# Patient Record
Sex: FEMALE | Race: WHITE | Employment: UNEMPLOYED | URBAN - METROPOLITAN AREA
[De-identification: names, ages, dates, MRNs, and addresses within clinical notes are randomized per-mention and may not be internally consistent; named-entity substitution may affect disease eponyms.]

---

## 2018-07-25 ENCOUNTER — OFFICE VISIT (OUTPATIENT)
Dept: ENT CLINIC | Age: 83
End: 2018-07-25
Payer: MEDICARE

## 2018-07-25 VITALS
WEIGHT: 110 LBS | HEART RATE: 89 BPM | OXYGEN SATURATION: 99 % | SYSTOLIC BLOOD PRESSURE: 142 MMHG | BODY MASS INDEX: 21.6 KG/M2 | HEIGHT: 60 IN | DIASTOLIC BLOOD PRESSURE: 72 MMHG

## 2018-07-25 DIAGNOSIS — R49.0 HOARSENESS: Primary | ICD-10-CM

## 2018-07-25 PROCEDURE — 1036F TOBACCO NON-USER: CPT | Performed by: OTOLARYNGOLOGY

## 2018-07-25 PROCEDURE — 31575 DIAGNOSTIC LARYNGOSCOPY: CPT | Performed by: OTOLARYNGOLOGY

## 2018-07-25 PROCEDURE — G8427 DOCREV CUR MEDS BY ELIG CLIN: HCPCS | Performed by: OTOLARYNGOLOGY

## 2018-07-25 PROCEDURE — 1101F PT FALLS ASSESS-DOCD LE1/YR: CPT | Performed by: OTOLARYNGOLOGY

## 2018-07-25 PROCEDURE — G8420 CALC BMI NORM PARAMETERS: HCPCS | Performed by: OTOLARYNGOLOGY

## 2018-07-25 PROCEDURE — 99204 OFFICE O/P NEW MOD 45 MIN: CPT | Performed by: OTOLARYNGOLOGY

## 2018-07-25 PROCEDURE — 1090F PRES/ABSN URINE INCON ASSESS: CPT | Performed by: OTOLARYNGOLOGY

## 2018-07-25 PROCEDURE — 1123F ACP DISCUSS/DSCN MKR DOCD: CPT | Performed by: OTOLARYNGOLOGY

## 2018-07-25 PROCEDURE — 4040F PNEUMOC VAC/ADMIN/RCVD: CPT | Performed by: OTOLARYNGOLOGY

## 2018-07-25 RX ORDER — FLUTICASONE PROPIONATE 50 MCG
2 SPRAY, SUSPENSION (ML) NASAL DAILY
Qty: 1 BOTTLE | Refills: 3 | Status: SHIPPED | OUTPATIENT
Start: 2018-07-25

## 2018-07-25 RX ORDER — BIOTIN 1 MG
TABLET ORAL DAILY
COMMUNITY

## 2018-07-25 ASSESSMENT — ENCOUNTER SYMPTOMS
COLOR CHANGE: 0
ABDOMINAL PAIN: 0
EYES NEGATIVE: 1
SHORTNESS OF BREATH: 0
SINUS PRESSURE: 1
RESPIRATORY NEGATIVE: 1
GASTROINTESTINAL NEGATIVE: 1
RHINORRHEA: 1

## 2018-07-25 NOTE — PROGRESS NOTES
Subjective:      Patient ID:  Maggy Payan is a 80 y.o. female. HPI:    Hoarseness  Patient presents with hoarseness. The patient describes moderate episodes of hoarseness which are gradually worsening over time. The episodes began 6 month(s) ago. Symptoms of gastroesophageal reflux is not noted. Tx: none Pt has hiatal hernia but no meds. Symptoms of allergic rhinitis is noted. Tx: nothing additional      Trauma/Surgeries in the chest or neck? no  Type:     Previous prolonged intubation: no  Time:   ago. 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 Review of Systems   Constitutional: Negative. Negative for fever. HENT: Positive for congestion, postnasal drip, rhinorrhea, sinus pressure and sneezing. Eyes: Negative. Negative for visual disturbance. Respiratory: Negative. Negative for shortness of breath. Cardiovascular: Negative. Negative for chest pain. Gastrointestinal: Negative. Negative for abdominal pain. Genitourinary: Negative. Musculoskeletal: Negative. Skin: Negative. Negative for color change. Allergic/Immunologic: Positive for environmental allergies. Neurological: Negative. Psychiatric/Behavioral: Negative. Negative for behavioral problems and hallucinations. All other systems reviewed and are negative.               Objective:   Physical Exam Constitutional: She is oriented to person, place, and time. She appears well-developed and well-nourished. HENT:   Head: Normocephalic and atraumatic. Right Ear: Hearing, tympanic membrane, external ear and ear canal normal.   Left Ear: Hearing, tympanic membrane, external ear and ear canal normal.   Nose: Mucosal edema and rhinorrhea present. Mouth/Throat: Uvula is midline, oropharynx is clear and moist and mucous membranes are normal.   Inferior turbinates pale     DNS - No   Side - none   Amount - 0%       Eyes: Conjunctivae and EOM are normal. Pupils are equal, round, and reactive to light. Neck: Normal range of motion. Neck supple. Cardiovascular: Normal rate, regular rhythm and normal heart sounds. Pulmonary/Chest: Effort normal and breath sounds normal.   Abdominal: Soft. Bowel sounds are normal.   Neurological: She is alert and oriented to person, place, and time. Skin: Skin is warm and dry. Nursing note and vitals reviewed. Endoscopy Procedure Note    Pre-operative Diagnosis: Hoarseness    Post-operative Diagnosis: normal    Indications: Hoarseness, dysphagia or aspiration - not able to be clearly evaluated by indirect laryngoscopy    Anesthesia: Lidocaine 4% and Devan-Synephrine 1/2%    Endoscopy Type:  nasal endoscopy, nasopharyngoscopy, laryngoscopy    Procedure Details   With the patient sitting upright in the examining chair informed consent was obtained. The bilateral side(s) of the nose were topically anesthetized with spray. After waiting an appropriate period of time for anesthesia/ vasoconstriction to become effective, the 0-degree  flexible laryngoscope was passed through the right side(s) of the nose, and the nose, nasopharynx, oropharynx, hypopharynx and larynx were examined. An identical procedure was performed on the contralateral side. Examination was performed during quiet respiration and with phonation. I was present for the entire procedure.   The following

## 2019-04-05 ENCOUNTER — HOSPITAL ENCOUNTER (OUTPATIENT)
Dept: GENERAL RADIOLOGY | Age: 84
Discharge: HOME | End: 2019-04-07
Payer: COMMERCIAL

## 2019-04-05 DIAGNOSIS — R13.10 DYSPHAGIA, UNSPECIFIED TYPE: ICD-10-CM

## 2019-04-05 PROCEDURE — 74230 X-RAY XM SWLNG FUNCJ C+: CPT

## 2019-04-05 PROCEDURE — 2500000003 HC RX 250 WO HCPCS: Performed by: INTERNAL MEDICINE

## 2019-04-05 PROCEDURE — 92611 MOTION FLUOROSCOPY/SWALLOW: CPT

## 2019-04-05 RX ADMIN — BARIUM SULFATE 45 G: 0.6 CREAM ORAL at 11:30

## 2019-04-05 RX ADMIN — BARIUM SULFATE 115 ML: 960 POWDER, FOR SUSPENSION ORAL at 11:30

## 2019-04-05 NOTE — PROGRESS NOTES
STRATEGIES       Compensatory strategies that were beneficial included Chin tuck      STRUCTURAL/FUNCTIONAL ANOMALIES       No structural/functional anomalies were noted      CERVICAL ESOPHAGEAL STAGE :      Mild swelling at surgical site                           Patient and family were educated on diet recommendations and compensatory strategies        [x]The admitting diagnosis and active problem list, as listed below have been reviewed prior to initiation of this evaluation. ADMITTING DIAGNOSIS: Dysphagia, unspecified type [R13.10]     ACTIVE PROBLEM LIST: There is no problem list on file for this patient.